# Patient Record
Sex: MALE | Race: WHITE | NOT HISPANIC OR LATINO | URBAN - METROPOLITAN AREA
[De-identification: names, ages, dates, MRNs, and addresses within clinical notes are randomized per-mention and may not be internally consistent; named-entity substitution may affect disease eponyms.]

---

## 2023-09-27 ENCOUNTER — HOSPITAL ENCOUNTER (OUTPATIENT)
Dept: DATA CONVERSION | Facility: HOSPITAL | Age: 58
Discharge: HOME | End: 2023-09-27

## 2023-09-27 DIAGNOSIS — N18.32 CHRONIC KIDNEY DISEASE, STAGE 3B (MULTI): ICD-10-CM

## 2023-09-27 LAB
ALBUMIN SERPL-MCNC: 3.5 GM/DL (ref 3.5–5)
ANION GAP SERPL CALCULATED.3IONS-SCNC: 15 MMOL/L (ref 0–19)
BUN SERPL-MCNC: 23 MG/DL (ref 8–25)
BUN/CREAT SERPL: 14.4 RATIO (ref 8–21)
CALCIUM SERPL-MCNC: 9.1 MG/DL (ref 8.5–10.4)
CHLORIDE SERPL-SCNC: 99 MMOL/L (ref 97–107)
CO2 SERPL-SCNC: 25 MMOL/L (ref 24–31)
CREAT SERPL-MCNC: 1.6 MG/DL (ref 0.4–1.6)
GFR SERPL CREATININE-BSD FRML MDRD: 50 ML/MIN/1.73 M2
GLUCOSE SERPL-MCNC: 140 MG/DL (ref 65–99)
PHOSPHATE SERPL-MCNC: 4.3 MG/DL (ref 2.5–4.5)
POTASSIUM SERPL-SCNC: 3.8 MMOL/L (ref 3.4–5.1)
SODIUM SERPL-SCNC: 139 MMOL/L (ref 133–145)

## 2023-12-05 PROBLEM — N17.9 ACUTE RENAL FAILURE SYNDROME (CMS-HCC): Status: ACTIVE | Noted: 2023-12-05

## 2023-12-05 PROBLEM — Z99.81 SUPPLEMENTAL OXYGEN DEPENDENT: Status: ACTIVE | Noted: 2023-12-05

## 2023-12-05 PROBLEM — J96.20 ACUTE ON CHRONIC RESPIRATORY FAILURE (MULTI): Status: ACTIVE | Noted: 2023-12-05

## 2023-12-05 PROBLEM — I21.4 ACUTE NON-ST SEGMENT ELEVATION MYOCARDIAL INFARCTION (MULTI): Status: RESOLVED | Noted: 2023-12-05 | Resolved: 2023-12-05

## 2023-12-05 PROBLEM — R09.02 HYPOXIA: Status: ACTIVE | Noted: 2023-12-05

## 2023-12-05 PROBLEM — I87.2 VENOUS STASIS DERMATITIS: Status: ACTIVE | Noted: 2023-12-05

## 2023-12-05 PROBLEM — I50.33 ACUTE ON CHRONIC DIASTOLIC HEART FAILURE (MULTI): Status: RESOLVED | Noted: 2023-12-05 | Resolved: 2023-12-05

## 2023-12-05 PROBLEM — I48.91 ATRIAL FIBRILLATION (MULTI): Status: ACTIVE | Noted: 2023-12-05

## 2023-12-05 PROBLEM — R06.00 DYSPNEA: Status: RESOLVED | Noted: 2023-12-05 | Resolved: 2023-12-05

## 2023-12-05 PROBLEM — R13.10 SWALLOWING PAIN: Status: ACTIVE | Noted: 2023-12-05

## 2023-12-05 PROBLEM — S49.90XA INJURY OF UPPER EXTREMITY: Status: ACTIVE | Noted: 2023-12-05
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
## 2023-12-05 PROBLEM — I50.33 ACUTE ON CHRONIC DIASTOLIC HEART FAILURE (MULTI): Status: ACTIVE | Noted: 2023-12-05

## 2023-12-05 PROBLEM — S09.90XA INJURY OF HEAD: Status: ACTIVE | Noted: 2023-12-05

## 2023-12-05 PROBLEM — I21.4 ACUTE NON-ST SEGMENT ELEVATION MYOCARDIAL INFARCTION (MULTI): Status: ACTIVE | Noted: 2023-12-05

## 2023-12-05 PROBLEM — R60.1 ANASARCA: Status: ACTIVE | Noted: 2023-12-05

## 2023-12-05 PROBLEM — E87.1 HYPONATREMIA: Status: ACTIVE | Noted: 2023-12-05

## 2023-12-05 PROBLEM — I10 HYPERTENSION: Status: RESOLVED | Noted: 2023-12-05 | Resolved: 2023-12-05

## 2023-12-05 PROBLEM — G47.34 IDIOPATHIC SLEEP RELATED NONOBSTRUCTIVE ALVEOLAR HYPOVENTILATION: Status: ACTIVE | Noted: 2023-12-05

## 2023-12-05 PROBLEM — I21.4 NON-STEMI (NON-ST ELEVATED MYOCARDIAL INFARCTION) (MULTI): Status: ACTIVE | Noted: 2023-12-05

## 2023-12-05 PROBLEM — E87.6 HYPOKALEMIA: Status: ACTIVE | Noted: 2023-12-05

## 2023-12-05 PROBLEM — J15.9 BACTERIAL PNEUMONIA: Status: ACTIVE | Noted: 2023-12-05

## 2023-12-05 PROBLEM — I10 HYPERTENSION: Status: ACTIVE | Noted: 2023-12-05

## 2023-12-05 PROBLEM — G47.33 OBSTRUCTIVE SLEEP APNEA SYNDROME: Status: ACTIVE | Noted: 2023-12-05

## 2023-12-05 PROBLEM — I48.91 ATRIAL FIBRILLATION (MULTI): Status: RESOLVED | Noted: 2023-12-05 | Resolved: 2023-12-05

## 2023-12-05 PROBLEM — E88.09 HYPOALBUMINEMIA: Status: ACTIVE | Noted: 2023-12-05

## 2023-12-05 PROBLEM — I21.4 NON-STEMI (NON-ST ELEVATED MYOCARDIAL INFARCTION) (MULTI): Status: RESOLVED | Noted: 2023-12-05 | Resolved: 2023-12-05

## 2023-12-05 PROBLEM — N18.32 STAGE 3B CHRONIC KIDNEY DISEASE (MULTI): Status: ACTIVE | Noted: 2023-12-05

## 2023-12-05 RX ORDER — PANTOPRAZOLE SODIUM 40 MG/1
40 TABLET, DELAYED RELEASE ORAL
COMMUNITY

## 2023-12-05 RX ORDER — RIVAROXABAN 20 MG/1
20 TABLET, FILM COATED ORAL
COMMUNITY
End: 2024-05-28

## 2023-12-05 RX ORDER — NADOLOL 20 MG/1
20 TABLET ORAL DAILY
COMMUNITY
End: 2024-05-28

## 2023-12-05 RX ORDER — AMIODARONE HYDROCHLORIDE 200 MG/1
200 TABLET ORAL DAILY
COMMUNITY

## 2023-12-05 RX ORDER — TORSEMIDE 100 MG/1
100 TABLET ORAL DAILY
COMMUNITY

## 2023-12-05 RX ORDER — SPIRONOLACTONE 25 MG/1
25 TABLET ORAL DAILY
COMMUNITY
End: 2024-05-28

## 2023-12-05 RX ORDER — POTASSIUM CHLORIDE 1500 MG/1
20 TABLET, EXTENDED RELEASE ORAL DAILY
COMMUNITY
Start: 2022-10-24

## 2023-12-05 RX ORDER — GUAIFENESIN 1200 MG
TABLET, EXTENDED RELEASE 12 HR ORAL
COMMUNITY
Start: 2021-08-13

## 2023-12-05 RX ORDER — ALBUTEROL SULFATE 90 UG/1
AEROSOL, METERED RESPIRATORY (INHALATION)
COMMUNITY
Start: 2022-01-05

## 2024-05-28 DIAGNOSIS — I10 PRIMARY HYPERTENSION: ICD-10-CM

## 2024-05-28 DIAGNOSIS — I50.33 ACUTE ON CHRONIC DIASTOLIC HEART FAILURE (MULTI): ICD-10-CM

## 2024-05-28 RX ORDER — RIVAROXABAN 20 MG/1
TABLET, FILM COATED ORAL
Qty: 90 TABLET | Refills: 3 | Status: SHIPPED | OUTPATIENT
Start: 2024-05-28

## 2024-05-28 RX ORDER — NADOLOL 20 MG/1
20 TABLET ORAL DAILY
Qty: 90 TABLET | Refills: 3 | Status: SHIPPED | OUTPATIENT
Start: 2024-05-28

## 2024-05-28 RX ORDER — SPIRONOLACTONE 25 MG/1
25 TABLET ORAL DAILY
Qty: 90 TABLET | Refills: 3 | Status: SHIPPED | OUTPATIENT
Start: 2024-05-28

## 2024-05-28 NOTE — TELEPHONE ENCOUNTER
Patients pharmacy is requesting refill of the following medication(s) for a 90 day supply with refills.   Please send the attached prescription(s) as soon as possible.   Thank you.    Requested Prescriptions     Pending Prescriptions Disp Refills    spironolactone (Aldactone) 25 mg tablet [Pharmacy Med Name: Spironolactone Oral Tablet 25 MG] 90 tablet 3     Sig: TAKE ONE TABLET BY MOUTH ONCE A DAY    nadolol (Corgard) 20 mg tablet [Pharmacy Med Name: Nadolol Oral Tablet 20 MG] 90 tablet 3     Sig: TAKE ONE TABLET BY MOUTH ONCE A DAY    Xarelto 20 mg tablet [Pharmacy Med Name: Xarelto Oral Tablet 20 MG] 90 tablet 3     Sig: TAKE ONE TABLET BY MOUTH once EVERY DAY WITH FOOD

## 2024-05-29 DIAGNOSIS — I50.33 ACUTE ON CHRONIC DIASTOLIC HEART FAILURE (MULTI): ICD-10-CM

## 2024-05-29 DIAGNOSIS — E87.6 HYPOKALEMIA: ICD-10-CM

## 2024-05-29 RX ORDER — POTASSIUM CHLORIDE 20 MEQ/1
TABLET, EXTENDED RELEASE ORAL
Qty: 90 TABLET | Refills: 3 | Status: SHIPPED | OUTPATIENT
Start: 2024-05-29

## 2024-05-29 NOTE — TELEPHONE ENCOUNTER
Patients pharmacy is requesting refill of the following medication(s) for a 90 day supply with refills.   Please send the attached prescription(s) as soon as possible.   Thank you.    Requested Prescriptions     Pending Prescriptions Disp Refills    potassium chloride CR 20 mEq ER tablet [Pharmacy Med Name: Potassium Chloride Asya ER Oral Tablet Extended Release 20 MEQ] 90 tablet 3     Sig: TAKE ONE TABLET BY MOUTH once EVERY DAY WITH FOOD

## 2024-06-09 NOTE — PROGRESS NOTES
Subjective   Byron Sherman is a 58 y.o. male who presents for NPV TO ESTABLISH PCP CARE and FOR CARE GAP REVIEW.    HPI:    58 y.o. male who presents for NPV TO ESTABLISH PCP CARE and FOR CARE GAP REVIEW.       EMR/EPIC records reviewed.    PMHx:  HTN  Atrial fibrillation (Multi); on xarelto  Stage 3b chronic kidney disease (Multi)  Acute non-ST segment elevation myocardial infarction (Multi)  Acute on chronic diastolic heart failure (Multi)  Acute on chronic respiratory failure (Multi)  Acute renal failure syndrome (CMS-HCC)  Anasarca  Bacterial pneumonia  Hypoalbuminemia  Hypokalemia  Hyponatremia  Hypoxia  Idiopathic sleep related nonobstructive alveolar hypoventilation  Injury of head  Injury of upper extremity  Obstructive sleep apnea syndrome  Supplemental oxygen dependent  Swallowing pain  Venous stasis dermatitis  Accidental fall    Healthcare Providers:  Cardiology:  NONE  Nephrologist: Dr. Michaud; last seen 11/2023    Preventive Health Services:  -Last physical: ?  -last colonoscopy or cologuard: ?  -last STI screening: ?  -Hep C screening: ?      Immunizations:   -Childhood vaccines: completed per patient    -COVID vaccine and booster:  -updated COVID spike vaccine: NOW DUE  -shingles: NOW DUE  -prevnar 20:   NOW DUE    Immunization History   Administered Date(s) Administered    Flu vaccine, quadrivalent, recombinant, preservative free, adult (FLUBLOK) 10/22/2021, 10/17/2022    Moderna SARS-CoV-2 Vaccination 04/02/2021, 04/30/2021    Pneumococcal polysaccharide vaccine, 23-valent, age 2 years and older (PNEUMOVAX 23) 10/22/2021    Tdap vaccine, age 7 year and older (BOOSTRIX, ADACEL) 06/13/2022       Today  Byron reports:    - doing and feeling well.     -taking all medications as prescribed with no reported adverse medication side effects        Today he no other reported complaints, issues, or problems.  ROS is NEG for HEADACHE, NAUSEA, VOMITING, DIARRHEA, CHEST PAIN, SOB, and BLEEDING.  Review of  systems (10+) is negative for all systems except for any identified issues in HPI above.        Objective     /76 (BP Location: Right arm, Patient Position: Sitting, BP Cuff Size: Thigh)   Pulse 68   Temp 36.3 °C (97.4 °F) (Tympanic)   Wt (!) 193 kg (425 lb)   SpO2 100%   BMI 59.28 kg/m²      Physical Examination:       GENERAL           General Appearance: well-appearing, well-developed, well-hydrated, well-nourished, no acute distress, morbidly obese        HEENT           NECK supple, no masses or thyromegaly, no carotid bruit.        EYES           Extraocular Movements: normal, bilateral eyes ROSETTE, no conjunctival injection.        HEART           Rate and Rhythm regular rate and rhythm. Heart sounds: normal S1S2, no S3 or S4. Murmurs: none.        CHEST           Breath sounds: Clear to IPPA, RR<16 no use of accessory muscles.        ABDOMEN           General: Neg for LKKS or masses, no scleral icterus or jaundice.        MUSCULOSKELETAL           Joints Demonstration: Neg for erythema, swelling or joint deformities. gross abnormalities no gross abnormalities.        EXTREMITIES           Lower Extremities: Neg for cyanosis, clubbing or edema.       Assessment/Plan   Problem List Items Addressed This Visit       Acute on chronic respiratory failure (Multi)    Relevant Orders    Referral to Cardiology    Hypertension    Relevant Orders    Comprehensive metabolic panel    Urinalysis with Reflex Microscopic    Hypokalemia    Relevant Orders    Comprehensive metabolic panel    Non-STEMI (non-ST elevated myocardial infarction) (Multi)    Relevant Orders    Referral to Cardiology    Referral to Cardiology    Obstructive sleep apnea syndrome    Relevant Orders    Referral to Adult Sleep Medicine    Atrial fibrillation (Multi)    Relevant Orders    Referral to Cardiology    Referral to Cardiology    Stage 3b chronic kidney disease (Multi)    Relevant Orders    Comprehensive metabolic panel    Urinalysis  with Reflex Microscopic    Referral to Nephrology    Supplemental oxygen dependent    Relevant Orders    Referral to Pulmonology     Other Visit Diagnoses       Encounter to establish care    -  Primary    Relevant Orders    CBC and Auto Differential    Comprehensive metabolic panel    Urinalysis with Reflex Microscopic    Tsh With Reflex To Free T4 If Abnormal    Lipid screening        Relevant Orders    Lipid panel    Diabetes mellitus screening        Relevant Orders    Hemoglobin A1c    Vitamin D deficiency        Relevant Orders    Vitamin D 25-Hydroxy,Total (for eval of Vitamin D levels)    Encounter for hepatitis C screening test for low risk patient        Relevant Orders    Hepatitis C antibody    Routine screening for STI (sexually transmitted infection)        Relevant Orders    HIV 1/2 Antigen/Antibody Screen with Reflex to Confirmation    Syphilis Screen with Reflex    C. trachomatis / N. gonorrhoeae, DNA probe    Trichomonas vaginalis, Amplified    Colon cancer screening        Relevant Orders    Cologuard® colon cancer screening    Colonoscopy Screening; Average Risk Patient    Immunization counseling        Encounter for screening for coronary artery disease        Relevant Orders    CT cardiac scoring wo IV contrast    Prostate cancer screening        Relevant Orders    Prostate Spec.Ag,Screen          Establish PCP care  -labs ordered (see A/P above for details)    HTN: 136/76; well controlled  -CMP, UA ordered  -cont BP medications  -low salt, low cholesterol, low fat diet    Afib on xaerlto and CHF: rate controlled, no signs of ADHF.   -cont medications  -referral to cardiology Dr. Vallabehini ordered  -ordered general cardiology referral as well (to be seen by first available provider)  -Emergency Room precautions discussed and reviewed with patient    CKD stage 3B:  -CMP ordered  -cont management per nephrology  -BP control to preserve renal function    Morbid obesity:  -lipid panel and HgBA1c  ordered  -low salt, low cholesterol, low fat diet, regularly exercise  -encouraged weight loss      Supplemental O2 dependence: does not use supplemental O2  -referral to pulmonology ordered  -Emergency Room precautions discussed and reviewed with patient    CAMILLE: not using CPAP  -referral to sleep medicine    Lipid Disorder screening  -lipid panel ordered    Diabetes Screening  -HgBA1c ordered    Vitamin D deficiency  -Vit D levels ordered     Hep C screening  -Hep C antibody ordered     STI Screening:  -HIV, syphilis, GC/CT, trich ordered    Colon Cancer Screening: NOW DUE  -cologuard and colonoscopy ordered; patient advised to complete only one of these colon cancer screenings and not both    Prostate Cancer Screening:  -PSA ordered    Heart Disease Screening:  -CT Heart Ca ordered     Counseling:       Medication education:         Education:  The patient is counseled regarding potential side-effects of all new medications        Understanding:  Patient expressed understanding        Adherence:  No barriers to adherence identified        Immunizations Counseling  -Prevnar 20 and shingles now due ==>   -recommend updated COVID spike vaccine that can be obtained at local pharmacy     FOLLOW-UP: 4 weeks to discuss and review test results and 8 weeks for PHYSICAL     Discussed recommended plan of care with patient. Patient expressed understanding and agreement with plan of care. All of patient's questions were answered at the time. Patient had no additional questions at the time.                 Ying Nick MD, PhD

## 2024-06-09 NOTE — PATIENT INSTRUCTIONS
It was nice meeting you today.    Please go to [facility] lab or another [facility] lab facility to complete the lab testing that I ordered      Please call radiology to schedule  CT Heart Ca scoring     Please call referral line to schedule to see: 1) pulmonology and 2) cardiology and 3) sleep medicine; and 4) cardiology    Please call to see your nephrologist    Please call cardiology Dr. Montiel's office to schedule appt who comes to our office on some Thursdays.    Please see first available cardiologist for heart failure and afib.    I have also ordered a cologuard if you prefer instead of a colonoscopy to screen for colon cancer screening that will be sent to your home. Only complete EITHER the colonoscopy or cologuard and not both.     I recommend receiving the, shingles vaccine, and  the Prevnar 20 pneumonia vaccine    I recommend the updated COVID vaccine that can be obtained at your local pharmacy    Please schedule a follow up with me in 4  weeks to discuss and review your test results    If you develop chest pain, heart palpitations or pass out immediately call 911

## 2024-06-10 ENCOUNTER — OFFICE VISIT (OUTPATIENT)
Dept: PRIMARY CARE | Facility: CLINIC | Age: 59
End: 2024-06-10
Payer: COMMERCIAL

## 2024-06-10 VITALS
DIASTOLIC BLOOD PRESSURE: 76 MMHG | TEMPERATURE: 97.4 F | HEART RATE: 68 BPM | WEIGHT: 315 LBS | BODY MASS INDEX: 59.28 KG/M2 | OXYGEN SATURATION: 100 % | SYSTOLIC BLOOD PRESSURE: 136 MMHG

## 2024-06-10 DIAGNOSIS — Z12.11 COLON CANCER SCREENING: ICD-10-CM

## 2024-06-10 DIAGNOSIS — Z99.81 SUPPLEMENTAL OXYGEN DEPENDENT: ICD-10-CM

## 2024-06-10 DIAGNOSIS — Z13.6 ENCOUNTER FOR SCREENING FOR CORONARY ARTERY DISEASE: ICD-10-CM

## 2024-06-10 DIAGNOSIS — Z76.89 ENCOUNTER TO ESTABLISH CARE: Primary | ICD-10-CM

## 2024-06-10 DIAGNOSIS — E55.9 VITAMIN D DEFICIENCY: ICD-10-CM

## 2024-06-10 DIAGNOSIS — Z71.85 IMMUNIZATION COUNSELING: ICD-10-CM

## 2024-06-10 DIAGNOSIS — I21.4 NON-STEMI (NON-ST ELEVATED MYOCARDIAL INFARCTION) (MULTI): ICD-10-CM

## 2024-06-10 DIAGNOSIS — Z13.1 DIABETES MELLITUS SCREENING: ICD-10-CM

## 2024-06-10 DIAGNOSIS — Z12.5 PROSTATE CANCER SCREENING: ICD-10-CM

## 2024-06-10 DIAGNOSIS — G47.33 OBSTRUCTIVE SLEEP APNEA SYNDROME: ICD-10-CM

## 2024-06-10 DIAGNOSIS — Z11.3 ROUTINE SCREENING FOR STI (SEXUALLY TRANSMITTED INFECTION): ICD-10-CM

## 2024-06-10 DIAGNOSIS — N18.32 STAGE 3B CHRONIC KIDNEY DISEASE (MULTI): ICD-10-CM

## 2024-06-10 DIAGNOSIS — J96.21 ACUTE ON CHRONIC RESPIRATORY FAILURE WITH HYPOXIA (MULTI): ICD-10-CM

## 2024-06-10 DIAGNOSIS — I48.91 ATRIAL FIBRILLATION, UNSPECIFIED TYPE (MULTI): ICD-10-CM

## 2024-06-10 DIAGNOSIS — I10 PRIMARY HYPERTENSION: ICD-10-CM

## 2024-06-10 DIAGNOSIS — Z11.59 ENCOUNTER FOR HEPATITIS C SCREENING TEST FOR LOW RISK PATIENT: ICD-10-CM

## 2024-06-10 DIAGNOSIS — E87.6 HYPOKALEMIA: ICD-10-CM

## 2024-06-10 DIAGNOSIS — Z13.220 LIPID SCREENING: ICD-10-CM

## 2024-06-10 PROCEDURE — 3078F DIAST BP <80 MM HG: CPT | Performed by: FAMILY MEDICINE

## 2024-06-10 PROCEDURE — 3075F SYST BP GE 130 - 139MM HG: CPT | Performed by: FAMILY MEDICINE

## 2024-06-10 PROCEDURE — 1036F TOBACCO NON-USER: CPT | Performed by: FAMILY MEDICINE

## 2024-06-10 PROCEDURE — 99204 OFFICE O/P NEW MOD 45 MIN: CPT | Performed by: FAMILY MEDICINE

## 2024-06-10 ASSESSMENT — COLUMBIA-SUICIDE SEVERITY RATING SCALE - C-SSRS
6. HAVE YOU EVER DONE ANYTHING, STARTED TO DO ANYTHING, OR PREPARED TO DO ANYTHING TO END YOUR LIFE?: NO
1. IN THE PAST MONTH, HAVE YOU WISHED YOU WERE DEAD OR WISHED YOU COULD GO TO SLEEP AND NOT WAKE UP?: NO
2. HAVE YOU ACTUALLY HAD ANY THOUGHTS OF KILLING YOURSELF?: NO

## 2024-06-10 ASSESSMENT — PAIN SCALES - GENERAL: PAINLEVEL: 0-NO PAIN

## 2024-06-26 LAB — NONINV COLON CA DNA+OCC BLD SCRN STL QL: POSITIVE

## 2024-07-14 NOTE — PROGRESS NOTES
Subjective   Byron Sherman is a 58 y.o. male who presents for FOLLOW UP VISIT TO DISCUSS and REVIEW TEST RESULTS.    HPI:    58 y.o. male who presents for FOLLOW UP VISIT TO DISCUSS and REVIEW TEST RESULTS.    Last seen by me on 6/10/24 for NPV TO ESTABLISH PCP CARE and FOR CARE GAP REVIEW. At visit:  Establish PCP care  -labs ordered (see A/P above for details)     HTN: 136/76; well controlled  -CMP, UA ordered  -cont BP medications  -low salt, low cholesterol, low fat diet     Afib on xaerlto and CHF: rate controlled, no signs of ADHF.   -cont medications  -referral to cardiology Dr. Vallabehini ordered  -ordered general cardiology referral as well (to be seen by first available provider)  -Emergency Room precautions discussed and reviewed with patient     CKD stage 3B:  -CMP ordered  -cont management per nephrology  -BP control to preserve renal function     Morbid obesity:  -lipid panel and HgBA1c ordered  -low salt, low cholesterol, low fat diet, regularly exercise  -encouraged weight loss        Supplemental O2 dependence: does not use supplemental O2  -referral to pulmonology ordered  -Emergency Room precautions discussed and reviewed with patient     CAMILLE: not using CPAP  -referral to sleep medicine     Lipid Disorder screening  -lipid panel ordered     Diabetes Screening  -HgBA1c ordered     Vitamin D deficiency  -Vit D levels ordered     Hep C screening  -Hep C antibody ordered     STI Screening:  -HIV, syphilis, GC/CT, trich ordered     Colon Cancer Screening: NOW DUE  -cologuard and colonoscopy ordered; patient advised to complete only one of these colon cancer screenings and not both     Prostate Cancer Screening:  -PSA ordered     Heart Disease Screening:  -CT Heart Ca ordered     Immunizations Counseling  -Prevnar 20 and shingles now due ==>   -recommend updated COVID spike vaccine that can be obtained at local pharmacy     FOLLOW-UP: 4 weeks to discuss and review test results and 8 weeks for PHYSICAL           PMHx:  HTN  Atrial fibrillation (Multi); on xarelto  Stage 3b chronic kidney disease (Multi)  Acute non-ST segment elevation myocardial infarction (Multi)  Acute on chronic diastolic heart failure (Multi)  Acute on chronic respiratory failure (Multi)  Acute renal failure syndrome (CMS-HCC)  Anasarca  Bacterial pneumonia  Hypoalbuminemia  Hypokalemia  Hyponatremia  Hypoxia  Idiopathic sleep related nonobstructive alveolar hypoventilation  Injury of head  Injury of upper extremity  Obstructive sleep apnea syndrome  Supplemental oxygen dependent  Swallowing pain  Venous stasis dermatitis  Accidental fall     Healthcare Providers:  Cardiology:  NONE  Nephrologist: Dr. Michaud; last seen 11/2023     Preventive Health Services:  -Last physical: ?  -last colonoscopy or cologuard: NEGATIVE COLOGUARD 6/21/24==> NEXT COLOGUARD DUE 6/21/27  -last STI screening: ?  -Hep C screening: ?        Immunizations:   -Childhood vaccines: completed per patient    -updated COVID spike vaccine: NOW DUE  -shingles: NOW DUE  -prevnar 20:   NOW DUE     Immunization History   Administered Date(s) Administered    Flu vaccine, quadrivalent, recombinant, preservative free, adult (FLUBLOK) 10/22/2021, 10/17/2022    Moderna SARS-CoV-2 Vaccination 04/02/2021, 04/30/2021    Pneumococcal polysaccharide vaccine, 23-valent, age 2 years and older (PNEUMOVAX 23) 10/22/2021    Tdap vaccine, age 7 year and older (BOOSTRIX, ADACEL) 06/13/2022       INTERVAL HISTORY     -completed cologuard colon cancer screening 6/21/24 that was negative     -labs ordered 6/10/24 NOT YET COMPLETED      Today  Byron reports:     - doing and feeling well.      -taking all medications as prescribed with no reported adverse medication side effects    -has not yet scheduled cardiology, nephrology, or pulmonology appts with referrals ordered at visit 6/10/24          Today he no other reported complaints, issues, or problems.  ROS is NEG for HEADACHE, NAUSEA, VOMITING,  DIARRHEA, CHEST PAIN, SOB, and BLEEDING.  Review of systems (10+) is negative for all systems except for any identified issues in HPI above.           Objective     There were no vitals taken for this visit.     Physical Examination:       GENERAL           General Appearance: well-appearing, well-developed, well-hydrated, well-nourished, no acute distress.        HEENT           NECK supple, no masses or thyromegaly, no carotid bruit.        EYES           Extraocular Movements: normal, bilateral eyes ROSETTE, no conjunctival injection.        HEART           Rate and Rhythm regular rate and rhythm. Heart sounds: normal S1S2, no S3 or S4. Murmurs: none.        CHEST           Breath sounds: Clear to IPPA, RR<16 no use of accessory muscles.        ABDOMEN           General: Neg for LKKS or masses, no scleral icterus or jaundice.        MUSCULOSKELETAL           Joints Demonstration: Neg for erythema, swelling or joint deformities. gross abnormalities no gross abnormalities.        EXTREMITIES           Lower Extremities: Neg for cyanosis, clubbing or edema.       Assessment/Plan   Problem List Items Addressed This Visit    None      HTN: well controlled  -CMP, UA ordered 6/10/24==> NOT YET COMPLETED==> patient advised to go to  lab to complete  -cont BP medications  -low salt, low cholesterol, low fat diet     Afib on xaerlto and CHF: rate controlled, no signs of ADHF.   -cont medications  -referral to cardiology Dr. Vallabehini ordered 6/10/24  -ordered general cardiology referral as well (to be seen by first available provider) 6/10/24  -Emergency Room precautions discussed and reviewed with patient     CKD stage 3B:  -CMP ordered 6/10/24==> NOT YET COMPLETED==> patient advised to go to  lab to complete  -cont management per nephrology  -BP control to preserve renal function     Morbid obesity:  -lipid panel and HgBA1c ordered 6/10/24==> NOT YET COMPLETED==> patient advised to go to  lab to complete  -low  salt, low cholesterol, low fat diet, regularly exercise  -encouraged weight loss  -will cont to monitor        Supplemental O2 dependence: does not use supplemental O2 as previously advised  -referral to pulmonology ordered 6/10/24  -Emergency Room precautions discussed and reviewed with patient     CAMILLE: not using CPAP  -referral to sleep medicine ordered 6/10/24     Lipid Disorder screening  -lipid panel ordered 6/10/24==> NOT YET COMPLETED==> patient advised to go to [facility] lab to complete     Diabetes Screening  -HgBA1c ordered 6/10/24==> NOT YET COMPLETED==> patient advised to go to [facility] lab to complete     Vitamin D deficiency  -Vit D levels ordered 6/10/24==> NOT YET COMPLETED==> patient advised to go to [facility] lab to complete     Hep C screening  -Hep C antibody ordered 6/10/24==> NOT YET COMPLETED==> patient advised to go to [facility] lab to complete     STI Screening:  -HIV, syphilis, GC/CT, trich ordered 6/10/24==> NOT YET COMPLETED==> patient advised to go to [facility] lab to complete     Colon Cancer Screening:  NEGATIVE COLOGUARD 6/21/24==> NEXT COLOGUARD DUE 6/21/27       Prostate Cancer Screening:  -PSA ordered  6/10/24==> NOT YET COMPLETED==> patient advised to go to [facility] lab to complete     Heart Disease Screening:  -CT Heart Ca ordered 6/10/24     Counseling:       Medication education:         Education:  The patient is counseled regarding potential side-effects of all new medications        Understanding:  Patient expressed understanding        Adherence:  No barriers to adherence identified        Immunizations Counseling  -Prevnar 20 and shingles now due ==> declined today  -recommend updated COVID spike vaccine that can be obtained at local pharmacy     FOLLOW-UP: 4 weeks to discuss and review test results and 8 weeks for PHYSICAL     Discussed recommended plan of care with patient. Patient expressed understanding and agreement with plan of care. All of patient's questions were answered at the time. Patient had no  additional questions at the time.         Ying Nick MD, PhD

## 2024-07-15 ENCOUNTER — APPOINTMENT (OUTPATIENT)
Dept: PRIMARY CARE | Facility: CLINIC | Age: 59
End: 2024-07-15
Payer: COMMERCIAL

## 2024-07-15 DIAGNOSIS — G47.33 OBSTRUCTIVE SLEEP APNEA SYNDROME: ICD-10-CM

## 2024-07-15 DIAGNOSIS — I48.91 ATRIAL FIBRILLATION, UNSPECIFIED TYPE (MULTI): ICD-10-CM

## 2024-07-15 DIAGNOSIS — Z11.59 ENCOUNTER FOR HEPATITIS C SCREENING TEST FOR LOW RISK PATIENT: ICD-10-CM

## 2024-07-15 DIAGNOSIS — Z12.5 PROSTATE CANCER SCREENING: ICD-10-CM

## 2024-07-15 DIAGNOSIS — I10 PRIMARY HYPERTENSION: Primary | ICD-10-CM

## 2024-07-15 DIAGNOSIS — I21.4 NON-STEMI (NON-ST ELEVATED MYOCARDIAL INFARCTION) (MULTI): ICD-10-CM

## 2024-07-15 DIAGNOSIS — E55.9 VITAMIN D DEFICIENCY: ICD-10-CM

## 2024-07-15 DIAGNOSIS — Z11.3 ROUTINE SCREENING FOR STI (SEXUALLY TRANSMITTED INFECTION): ICD-10-CM

## 2024-07-15 DIAGNOSIS — Z71.85 IMMUNIZATION COUNSELING: ICD-10-CM

## 2024-07-15 DIAGNOSIS — Z13.1 DIABETES MELLITUS SCREENING: ICD-10-CM

## 2024-07-15 DIAGNOSIS — Z13.220 LIPID SCREENING: ICD-10-CM

## 2024-07-15 DIAGNOSIS — N18.32 STAGE 3B CHRONIC KIDNEY DISEASE (MULTI): ICD-10-CM

## 2024-07-28 NOTE — PROGRESS NOTES
Subjective   Byron Sherman is a 58 y.o. male who presents for FOLLOW UP VISIT TO DISCUSS and REVIEW TEST RESULTS.    HPI:    58 y.o. male who presents for FOLLOW UP VISIT TO DISCUSS and REVIEW TEST RESULTS.    Last seen by me on 6/10/24 for NPV TO ESTABLISH PCP CARE and FOR CARE GAP REVIEW. At visit:  Establish PCP care  -labs ordered (see A/P above for details)     HTN: 136/76; well controlled  -CMP, UA ordered  -cont BP medications  -low salt, low cholesterol, low fat diet     Afib on xaerlto and CHF: rate controlled, no signs of ADHF.   -cont medications  -referral to cardiology Dr. Vallabehini ordered  -ordered general cardiology referral as well (to be seen by first available provider)  -Emergency Room precautions discussed and reviewed with patient     CKD stage 3B:  -CMP ordered  -cont management per nephrology  -BP control to preserve renal function     Morbid obesity:  -lipid panel and HgBA1c ordered  -low salt, low cholesterol, low fat diet, regularly exercise  -encouraged weight loss        Supplemental O2 dependence: does not use supplemental O2  -referral to pulmonology ordered  -Emergency Room precautions discussed and reviewed with patient     CAMILLE: not using CPAP  -referral to sleep medicine     Lipid Disorder screening  -lipid panel ordered     Diabetes Screening  -HgBA1c ordered     Vitamin D deficiency  -Vit D levels ordered     Hep C screening  -Hep C antibody ordered     STI Screening:  -HIV, syphilis, GC/CT, trich ordered     Colon Cancer Screening: NOW DUE  -cologuard and colonoscopy ordered; patient advised to complete only one of these colon cancer screenings and not both     Prostate Cancer Screening:  -PSA ordered     Heart Disease Screening:  -CT Heart Ca ordered     Immunizations Counseling  -Prevnar 20 and shingles now due ==>   -recommend updated COVID spike vaccine that can be obtained at local pharmacy     FOLLOW-UP: 4 weeks to discuss and review test results and 8 weeks for PHYSICAL           PMHx:  HTN  Atrial fibrillation (Multi); on xarelto  Stage 3b chronic kidney disease (Multi)  Acute non-ST segment elevation myocardial infarction (Multi)  Acute on chronic diastolic heart failure (Multi)  Acute on chronic respiratory failure (Multi)  Acute renal failure syndrome (CMS-HCC)  Anasarca  Bacterial pneumonia  Hypoalbuminemia  Hypokalemia  Hyponatremia  Hypoxia  Idiopathic sleep related nonobstructive alveolar hypoventilation  Injury of head  Injury of upper extremity  Obstructive sleep apnea syndrome  Supplemental oxygen dependent  Swallowing pain  Venous stasis dermatitis  Accidental fall     Healthcare Providers:  Cardiology:  NONE  Nephrologist: Dr. Michaud; last seen 11/2023     Preventive Health Services:  -Last physical: ?  -last colonoscopy or cologuard: POSITIVE COLOGUARD 6/21/24==> DIAGNOSTIC COLONOSCOPY ORDERED  -last STI screening: ?  -Hep C screening: ?        Immunizations:   -Childhood vaccines: completed per patient    -updated COVID spike vaccine: NOW DUE  -shingles: NOW DUE  -prevnar 20:   NOW DUE     Immunization History   Administered Date(s) Administered    Flu vaccine, quadrivalent, recombinant, preservative free, adult (FLUBLOK) 10/22/2021, 10/17/2022    Moderna SARS-CoV-2 Vaccination 04/02/2021, 04/30/2021    Pneumococcal polysaccharide vaccine, 23-valent, age 2 years and older (PNEUMOVAX 23) 10/22/2021    Tdap vaccine, age 7 year and older (BOOSTRIX, ADACEL) 06/13/2022       INTERVAL HISTORY     -completed cologuard colon cancer screening 6/21/24 that was POSITIVE     -ABNORMAL POSITIVE COLOGUARD COLON CANCER SCREENING TEST that REQUIRES DIAGNOSTIC COLONOSCOPY TO RULE OUT COLON CANCER.     ==> patient advised I have ordered a diagnostic colonoscopy; please call to schedule since This is very important.     -labs ordered 6/10/24 NOT YET COMPLETED      Today  Byron reports:     - doing and feeling well.      -taking all medications as prescribed with no reported adverse  medication side effects    -has not yet scheduled cardiology, nephrology, or pulmonology appts with referrals ordered at visit 6/10/24    -has not yet scheduled diagnostic colonoscopy for positive cologuard test; he states that he will call to schedule and that he understands that it is important to complete to rule out colon cancer          Today he no other reported complaints, issues, or problems.    ROS is NEG for HEADACHE, NAUSEA, VOMITING, DIARRHEA, CHEST PAIN, SOB, and BLEEDING.  Review of systems (10+) is negative for all systems except for any identified issues in HPI above.           Objective     There were no vitals taken for this visit.     Physical Examination:       GENERAL           General Appearance: well-appearing, well-developed, well-hydrated, well-nourished, no acute distress.        HEENT           NECK supple, no masses or thyromegaly, no carotid bruit.        EYES           Extraocular Movements: normal, bilateral eyes ROSETTE, no conjunctival injection.        HEART           Rate and Rhythm regular rate and rhythm. Heart sounds: normal S1S2, no S3 or S4. Murmurs: none.        CHEST           Breath sounds: Clear to IPPA, RR<16 no use of accessory muscles.        ABDOMEN           General: Neg for LKKS or masses, no scleral icterus or jaundice.        MUSCULOSKELETAL           Joints Demonstration: Neg for erythema, swelling or joint deformities. gross abnormalities no gross abnormalities.        EXTREMITIES           Lower Extremities: Neg for cyanosis, clubbing or edema.       Assessment/Plan   Problem List Items Addressed This Visit    None    ABNORMAL POSITIVE COLOGUARD COLON CANCER SCREENING TEST (6/21/24) that REQUIRES DIAGNOSTIC COLONOSCOPY TO RULE OUT COLON CANCER.   -diagnostic colonoscopy previously ordered. Patient advised this is very important to complete to rule out colon cancer. He states that he will call to schedule     HTN: well controlled  -CMP, UA ordered 6/10/24==> NOT  YET COMPLETED==> patient advised to go to [facility] lab to complete  -cont BP medications  -low salt, low cholesterol, low fat diet     Afib on xaerlto and CHF: rate controlled, no signs of ADHF.   -cont medications  -referral to cardiology Dr. Vallabehini ordered 6/10/24  -ordered general cardiology referral as well (to be seen by first available provider) 6/10/24  -Emergency Room precautions discussed and reviewed with patient     CKD stage 3B:  -CMP ordered 6/10/24==> NOT YET COMPLETED==> patient advised to go to [facility] lab to complete  -cont management per nephrology  -BP control to preserve renal function     Morbid obesity:  -lipid panel and HgBA1c ordered 6/10/24==> NOT YET COMPLETED==> patient advised to go to [facility] lab to complete  -low salt, low cholesterol, low fat diet, regularly exercise  -encouraged weight loss  -will cont to monitor        Supplemental O2 dependence: does not use supplemental O2 as previously advised  -referral to pulmonology ordered 6/10/24  -Emergency Room precautions discussed and reviewed with patient     CAMILLE: not using CPAP  -referral to sleep medicine ordered 6/10/24     Lipid Disorder screening  -lipid panel ordered 6/10/24==> NOT YET COMPLETED==> patient advised to go to [facility] lab to complete     Diabetes Screening  -HgBA1c ordered 6/10/24==> NOT YET COMPLETED==> patient advised to go to [facility] lab to complete     Vitamin D deficiency  -Vit D levels ordered 6/10/24==> NOT YET COMPLETED==> patient advised to go to [facility] lab to complete     Hep C screening  -Hep C antibody ordered 6/10/24==> NOT YET COMPLETED==> patient advised to go to [facility] lab to complete     STI Screening:  -HIV, syphilis, GC/CT, trich ordered 6/10/24==> NOT YET COMPLETED==> patient advised to go to [facility] lab to complete       Prostate Cancer Screening:  -PSA ordered  6/10/24==> NOT YET COMPLETED==> patient advised to go to [facility] lab to complete     Heart Disease Screening:  -CT Heart Ca ordered 6/10/24     Counseling:       Medication  education:         Education:  The patient is counseled regarding potential side-effects of all new medications        Understanding:  Patient expressed understanding        Adherence:  No barriers to adherence identified        Immunizations Counseling  -Prevnar 20 and shingles now due ==> declined today  -recommend updated COVID spike vaccine that can be obtained at local pharmacy     FOLLOW-UP: 4 weeks to discuss and review test results and 8 weeks for PHYSICAL     Discussed recommended plan of care with patient. Patient expressed understanding and agreement with plan of care. All of patient's questions were answered at the time. Patient had no additional questions at the time.         Ying Nick MD, PhD

## 2024-07-30 ENCOUNTER — APPOINTMENT (OUTPATIENT)
Dept: PRIMARY CARE | Facility: CLINIC | Age: 59
End: 2024-07-30
Payer: COMMERCIAL

## 2024-07-30 DIAGNOSIS — N18.32 STAGE 3B CHRONIC KIDNEY DISEASE (MULTI): ICD-10-CM

## 2024-07-30 DIAGNOSIS — Z13.220 LIPID SCREENING: ICD-10-CM

## 2024-07-30 DIAGNOSIS — I10 PRIMARY HYPERTENSION: ICD-10-CM

## 2024-07-30 DIAGNOSIS — G47.33 OBSTRUCTIVE SLEEP APNEA SYNDROME: ICD-10-CM

## 2024-07-30 DIAGNOSIS — E55.9 VITAMIN D DEFICIENCY: ICD-10-CM

## 2024-07-30 DIAGNOSIS — R19.5 POSITIVE COLORECTAL CANCER SCREENING USING COLOGUARD TEST: Primary | ICD-10-CM

## 2024-07-30 DIAGNOSIS — Z13.1 DIABETES MELLITUS SCREENING: ICD-10-CM

## 2024-07-30 DIAGNOSIS — Z71.85 IMMUNIZATION COUNSELING: ICD-10-CM

## 2024-07-30 DIAGNOSIS — I48.91 ATRIAL FIBRILLATION, UNSPECIFIED TYPE (MULTI): ICD-10-CM

## 2024-08-11 NOTE — PROGRESS NOTES
Subjective   Byron Sherman is a 59 y.o. male who presents for FOLLOW UP VISIT TO DISCUSS and REVIEW TEST RESULTS, INCLUDING POSITIVE COLOGUARD.    HPI:    58 y.o. male who presents for FOLLOW UP VISIT TO DISCUSS and REVIEW TEST RESULTS, INCLUDING POSITIVE COLOGUARD..     [facility] records reviewed.    Last seen by me on 6/10/24 for NPV TO ESTABLISH PCP CARE and FOR CARE GAP REVIEW. At visit:    Establish PCP care  -labs ordered (see A/P above for details)     HTN: 136/76  -CMP, UA ordered  -cont BP medications  -low salt, low cholesterol, low fat diet     Afib on xaerlto and CHF: rate controlled, no signs of ADHF.   -cont medications  -referral to cardiology Dr. Vallabehini ordered  -ordered general cardiology referral as well (to be seen by first available provider)  -Emergency Room precautions discussed and reviewed with patient     CKD stage 3B:  -CMP ordered  -cont management per nephrology  -BP control to preserve renal function     Morbid obesity:  -lipid panel and HgBA1c ordered  -low salt, low cholesterol, low fat diet, regularly exercise  -encouraged weight loss        Supplemental O2 dependence: does not use supplemental O2  -referral to pulmonology ordered  -Emergency Room precautions discussed and reviewed with patient     CAMILLE: not using CPAP  -referral to sleep medicine     Lipid Disorder screening  -lipid panel ordered     Diabetes Screening  -HgBA1c ordered     Vitamin D deficiency  -Vit D levels ordered     Hep C screening  -Hep C antibody ordered     STI Screening:  -HIV, syphilis, GC/CT, trich ordered     Colon Cancer Screening: NOW DUE  -cologuard and colonoscopy ordered; patient advised to complete only one of these colon cancer screenings and not both     Prostate Cancer Screening:  -PSA ordered     Heart Disease Screening:  -CT Heart Ca ordered     Immunizations Counseling  -Prevnar 20 and shingles now due ==>   -recommend updated COVID spike vaccine that can be obtained at local pharmacy      FOLLOW-UP: 4 weeks to discuss and review test results and 8 weeks for PHYSICAL          PMHx:  HTN  Atrial fibrillation (Multi); on xarelto  Stage 3b chronic kidney disease (Multi)  Acute non-ST segment elevation myocardial infarction (Multi)  Acute on chronic diastolic heart failure (Multi)  Acute on chronic respiratory failure (Multi)  Acute renal failure syndrome (CMS-HCC)  Anasarca  Bacterial pneumonia  Hypoalbuminemia  Hypokalemia  Hyponatremia  Hypoxia  Idiopathic sleep related nonobstructive alveolar hypoventilation  Injury of head  Injury of upper extremity  Obstructive sleep apnea syndrome  Supplemental oxygen dependent  Swallowing pain  Venous stasis dermatitis  Accidental fall     Healthcare Providers:  Cardiology:  NONE  Nephrologist: Dr. Michaud; last seen 11/2023     Preventive Health Services:  -Last physical: ?  -last colonoscopy or cologuard: POSITIVE COLOGUARD 6/21/24==> DIAGNOSTIC COLONOSCOPY ORDERED  -last STI screening: ?  -Hep C screening: ?        Immunizations:   -Childhood vaccines: completed per patient    -updated COVID spike vaccine: NOW DUE  -shingles: NOW DUE  -prevnar 20:   NOW DUE     Immunization History   Administered Date(s) Administered    Flu vaccine, quadrivalent, recombinant, preservative free, adult (FLUBLOK) 10/22/2021, 10/17/2022    Moderna SARS-CoV-2 Vaccination 04/02/2021, 04/30/2021    Pneumococcal polysaccharide vaccine, 23-valent, age 2 years and older (PNEUMOVAX 23) 10/22/2021    Tdap vaccine, age 7 year and older (BOOSTRIX, ADACEL) 06/13/2022       INTERVAL HISTORY     -completed cologuard colon cancer screening 6/21/24 that was POSITIVE     -ABNORMAL POSITIVE COLOGUARD COLON CANCER SCREENING TEST that REQUIRES DIAGNOSTIC COLONOSCOPY TO RULE OUT COLON CANCER.     ==> patient advised I have ordered a diagnostic colonoscopy; please call to schedule since This is very important.     -labs ordered 6/10/24 NOT YET COMPLETED    -CT Heart Ca scoring ordered 6/10/24  NOT  YET COMPLETED        Today  Byron reports:     - doing and feeling well.      -taking all medications as prescribed with no reported adverse medication side effects    -has not yet scheduled cardiology, nephrology, or pulmonology appts with referrals ordered at visit 6/10/24    -has not yet scheduled diagnostic colonoscopy for positive cologuard test; he states that he will call to schedule and that he understands that it is important to complete to rule out colon cancer          Today he has no other reported complaints, issues, or problems.    ROS is NEG for HEADACHE, NAUSEA, VOMITING, DIARRHEA, CHEST PAIN, SOB, and BLEEDING.  Review of systems (12) is negative for all systems except for any identified issues in HPI above.           Objective     There were no vitals taken for this visit.     Physical Examination:       GENERAL           General Appearance: well-appearing, well-developed, well-hydrated, well-nourished, no acute distress.        HEENT           NECK supple, no masses or thyromegaly, no carotid bruit.        EYES           Extraocular Movements: normal, bilateral eyes ROSETTE, no conjunctival injection.        HEART           Rate and Rhythm regular rate and rhythm. Heart sounds: normal S1S2, no S3 or S4. Murmurs: none.        CHEST           Breath sounds: Clear to IPPA, RR<16 no use of accessory muscles.        ABDOMEN           General: Neg for LKKS or masses, no scleral icterus or jaundice.        MUSCULOSKELETAL           Joints Demonstration: Neg for erythema, swelling or joint deformities. gross abnormalities no gross abnormalities.        EXTREMITIES           Lower Extremities: Neg for cyanosis, clubbing or edema.       Assessment/Plan   Problem List Items Addressed This Visit    None    ABNORMAL POSITIVE COLOGUARD COLON CANCER SCREENING TEST (6/21/24) that REQUIRES DIAGNOSTIC COLONOSCOPY TO RULE OUT COLON CANCER.   -diagnostic colonoscopy previously ordered. Patient advised this is very  important to complete to rule out colon cancer. He states that he will call to schedule     HTN: well controlled. Followed by cardiology Dr. Ruiz  -CMP, UA ordered 6/10/24==> NOT YET COMPLETED==> patient advised to go to [facility] lab to complete  -cont BP medications  -low salt, low cholesterol, low fat diet     Afib on xaerlto and CHF: rate controlled, no signs of ADHF.   -cont medications  -referral to cardiology Dr. Vallabehini ordered 6/10/24  -ordered general cardiology referral as well (to be seen by first available provider) 6/10/24  -Emergency Room precautions discussed and reviewed with patient     CKD stage 3B:  -CMP ordered 6/10/24==> NOT YET COMPLETED==> patient advised to go to [facility] lab to complete  -cont management per nephrology  -BP control to preserve renal function  -patient counseled to AVOID NSAIDs that can worsen renal function     Morbid obesity:  -lipid panel and HgBA1c ordered 6/10/24==> NOT YET COMPLETED==> patient advised to go to [facility] lab to complete  -low salt, low cholesterol, low fat diet, regularly exercise  -encouraged weight loss  -will cont to monitor        Supplemental O2 dependence: does not use supplemental O2 as previously advised  -referral to pulmonology ordered 6/10/24  -Emergency Room precautions discussed and reviewed with patient     CAMILLE: not using CPAP  -referral to sleep medicine ordered 6/10/24     Lipid Disorder screening  -lipid panel ordered 6/10/24==> NOT YET COMPLETED==> patient advised to go to [facility] lab to complete     Diabetes Screening  -HgBA1c ordered 6/10/24==> NOT YET COMPLETED==> patient advised to go to [facility] lab to complete     Vitamin D deficiency  -Vit D levels ordered 6/10/24==> NOT YET COMPLETED==> patient advised to go to [facility] lab to complete     Hep C screening  -Hep C antibody ordered 6/10/24==> NOT YET COMPLETED==> patient advised to go to [facility] lab to complete     STI Screening:  -HIV, syphilis, GC/CT, trich ordered 6/10/24==> NOT YET COMPLETED==> patient advised  to go to  lab to complete       Prostate Cancer Screening:  -PSA ordered  6/10/24==> NOT YET COMPLETED==> patient advised to go to  lab to complete     Heart Disease Screening:  -CT Heart Ca ordered 6/10/24     Counseling:       Medication education:         Education:  The patient is counseled regarding potential side-effects of all new medications        Understanding:  Patient expressed understanding        Adherence:  No barriers to adherence identified        Immunizations Counseling  -Prevnar 20 and shingles now due ==> declined today  -recommend updated COVID spike vaccine that can be obtained at local pharmacy     FOLLOW-UP: 4 weeks to discuss and review test results and 8 weeks for PHYSICAL     Discussed recommended plan of care with patient. Patient expressed understanding and agreement with plan of care. All of patient's questions were answered at the time. Patient had no additional questions at the time.         Ying Nick MD, PhD

## 2024-08-13 ENCOUNTER — APPOINTMENT (OUTPATIENT)
Dept: PRIMARY CARE | Facility: CLINIC | Age: 59
End: 2024-08-13
Payer: COMMERCIAL

## 2024-08-28 NOTE — PROGRESS NOTES
Subjective   Byron Sherman is a 59 y.o. male who presents for FOLLOW UP VISIT TO DISCUSS and REVIEW TEST RESULTS, INCLUDING POSITIVE COLOGUARD.    HPI:    58 y.o. male who presents for FOLLOW UP VISIT TO DISCUSS and REVIEW TEST RESULTS, INCLUDING POSITIVE COLOGUARD..     [facility] records reviewed.    Last seen by me on 6/10/24 for NPV TO ESTABLISH PCP CARE and FOR CARE GAP REVIEW. At visit:    Establish PCP care  -labs ordered (see A/P above for details)     HTN: 136/76  -CMP, UA ordered  -cont BP medications  -low salt, low cholesterol, low fat diet     Afib on xaerlto and CHF: rate controlled, no signs of ADHF.   -cont medications  -referral to cardiology Dr. Vallabehini ordered  -ordered general cardiology referral as well (to be seen by first available provider)  -Emergency Room precautions discussed and reviewed with patient     CKD stage 3B:  -CMP ordered  -cont management per nephrology  -BP control to preserve renal function     Morbid obesity:  -lipid panel and HgBA1c ordered  -low salt, low cholesterol, low fat diet, regularly exercise  -encouraged weight loss        Supplemental O2 dependence: does not use supplemental O2  -referral to pulmonology ordered  -Emergency Room precautions discussed and reviewed with patient     CAMILLE: not using CPAP  -referral to sleep medicine     Lipid Disorder screening  -lipid panel ordered     Diabetes Screening  -HgBA1c ordered     Vitamin D deficiency  -Vit D levels ordered     Hep C screening  -Hep C antibody ordered     STI Screening:  -HIV, syphilis, GC/CT, trich ordered     Colon Cancer Screening: NOW DUE  -cologuard and colonoscopy ordered; patient advised to complete only one of these colon cancer screenings and not both     Prostate Cancer Screening:  -PSA ordered     Heart Disease Screening:  -CT Heart Ca ordered     Immunizations Counseling  -Prevnar 20 and shingles now due ==>   -recommend updated COVID spike vaccine that can be obtained at local pharmacy      FOLLOW-UP: 4 weeks to discuss and review test results and 8 weeks for PHYSICAL          PMHx:  HTN  Atrial fibrillation (Multi); on xarelto  Stage 3b chronic kidney disease (Multi)  Acute non-ST segment elevation myocardial infarction (Multi)  Acute on chronic diastolic heart failure (Multi)  Acute on chronic respiratory failure (Multi)  Acute renal failure syndrome (CMS-HCC)  Anasarca  Bacterial pneumonia  Hypoalbuminemia  Hypokalemia  Hyponatremia  Hypoxia  Idiopathic sleep related nonobstructive alveolar hypoventilation  Injury of head  Injury of upper extremity  Obstructive sleep apnea syndrome  Supplemental oxygen dependent  Swallowing pain  Venous stasis dermatitis  Accidental fall     Healthcare Providers:  Cardiology:  NONE  Nephrologist: Dr. Michaud; last seen 11/2023     Preventive Health Services:  -Last physical: ?  -last colonoscopy or cologuard: POSITIVE COLOGUARD 6/21/24==> DIAGNOSTIC COLONOSCOPY ORDERED  -last STI screening: ?  -Hep C screening: ?        Immunizations:   -Childhood vaccines: completed per patient    -updated COVID spike vaccine: NOW DUE  -shingles: NOW DUE  -prevnar 20:   NOW DUE     Immunization History   Administered Date(s) Administered    Flu vaccine, quadrivalent, recombinant, preservative free, adult (FLUBLOK) 10/22/2021, 10/17/2022    Moderna SARS-CoV-2 Vaccination 04/02/2021, 04/30/2021    Pneumococcal polysaccharide vaccine, 23-valent, age 2 years and older (PNEUMOVAX 23) 10/22/2021    Tdap vaccine, age 7 year and older (BOOSTRIX, ADACEL) 06/13/2022       INTERVAL HISTORY     -completed cologuard colon cancer screening 6/21/24 that was POSITIVE     -ABNORMAL POSITIVE COLOGUARD COLON CANCER SCREENING TEST that REQUIRES DIAGNOSTIC COLONOSCOPY TO RULE OUT COLON CANCER.     ==> patient advised I have ordered a diagnostic colonoscopy; please call to schedule since This is very important.       -labs ordered 6/10/24 NOT YET COMPLETED    -CT Heart Ca scoring ordered 6/10/24  NOT  YET COMPLETED        Today  Byron reports:     - doing and feeling well.      -taking all medications as prescribed with no reported adverse medication side effects    -has not yet scheduled cardiology, nephrology, or pulmonology appts with referrals ordered at visit 6/10/24    -has not yet scheduled diagnostic colonoscopy for positive cologuard test; he states that he will call to schedule and that he understands that it is important to complete to rule out colon cancer          Today he has no other reported complaints, issues, or problems.    ROS is NEG for HEADACHE, NAUSEA, VOMITING, DIARRHEA, CHEST PAIN, SOB, and BLEEDING.  Review of systems (12) is negative for all systems except for any identified issues in HPI above.           Objective     There were no vitals taken for this visit.     Physical Examination:       GENERAL           General Appearance: well-appearing, well-developed, well-hydrated, well-nourished, no acute distress.        HEENT           NECK supple, no masses or thyromegaly, no carotid bruit.        EYES           Extraocular Movements: normal, bilateral eyes ROSETTE, no conjunctival injection.        HEART           Rate and Rhythm regular rate and rhythm. Heart sounds: normal S1S2, no S3 or S4. Murmurs: none.        CHEST           Breath sounds: Clear to IPPA, RR<16 no use of accessory muscles.        ABDOMEN           General: Neg for LKKS or masses, no scleral icterus or jaundice.        MUSCULOSKELETAL           Joints Demonstration: Neg for erythema, swelling or joint deformities. gross abnormalities no gross abnormalities.        EXTREMITIES           Lower Extremities: Neg for cyanosis, clubbing or edema.       Assessment/Plan   Problem List Items Addressed This Visit    None    ABNORMAL POSITIVE COLOGUARD COLON CANCER SCREENING TEST (6/21/24) that REQUIRES DIAGNOSTIC COLONOSCOPY TO RULE OUT COLON CANCER.   -diagnostic colonoscopy previously ordered==> re-ORDERED TODAY. Patient  advised this is very important to complete to rule out colon cancer. He states that he will call to schedule     HTN: well controlled. Followed by cardiology Dr. Ruiz  -CMP, UA ordered 6/10/24==> NOT YET COMPLETED==> patient advised to go to  lab to complete  -cont BP medications  -low salt, low cholesterol, low fat diet     Afib on xaerlto and CHF: rate controlled, no signs of ADHF.   -cont medications  -referral to cardiology Dr. Vallabehini ordered 6/10/24  -ordered general cardiology referral as well (to be seen by first available provider) 6/10/24  -Emergency Room precautions discussed and reviewed with patient     CKD stage 3B:  -CMP ordered 6/10/24==> NOT YET COMPLETED==> patient advised to go to [facility] lab to complete  -cont management per nephrology  -BP control to preserve renal function  -patient counseled to AVOID NSAIDs that can worsen renal function     Morbid obesity:  -lipid panel and HgBA1c ordered 6/10/24==> NOT YET COMPLETED==> patient advised to go to [facility] lab to complete  -low salt, low cholesterol, low fat diet, regularly exercise  -encouraged weight loss  -will cont to monitor        Supplemental O2 dependence: does not use supplemental O2 as previously advised  -referral to pulmonology ordered 6/10/24  -Emergency Room precautions discussed and reviewed with patient     CAMILLE: not using CPAP  -referral to sleep medicine ordered 6/10/24     Lipid Disorder screening  -lipid panel ordered 6/10/24==> NOT YET COMPLETED==> patient advised to go to [facility] lab to complete     Diabetes Screening  -HgBA1c ordered 6/10/24==> NOT YET COMPLETED==> patient advised to go to [facility] lab to complete     Vitamin D deficiency  -Vit D levels ordered 6/10/24==> NOT YET COMPLETED==> patient advised to go to [facility] lab to complete     Hep C screening  -Hep C antibody ordered 6/10/24==> NOT YET COMPLETED==> patient advised to go to [facility] lab to complete     STI Screening:  -HIV, syphilis, GC/CT, trich ordered 6/10/24==> NOT YET  COMPLETED==> patient advised to go to  lab to complete       Prostate Cancer Screening:  -PSA ordered  6/10/24==> NOT YET COMPLETED==> patient advised to go to  lab to complete     Heart Disease Screening:  -CT Heart Ca ordered 6/10/24     Counseling:       Medication education:         Education:  The patient is counseled regarding potential side-effects of all new medications        Understanding:  Patient expressed understanding        Adherence:  No barriers to adherence identified        Immunizations Counseling  -Prevnar 20 and shingles now due ==> declined today  -Flu vaccine due fall 2024  -recommend updated COVID spike vaccine that can be obtained at local pharmacy     FOLLOW-UP: 4 weeks to discuss and review test results and 8 weeks for PHYSICAL     Discussed recommended plan of care with patient. Patient expressed understanding and agreement with plan of care. All of patient's questions were answered at the time. Patient had no additional questions at the time.         Ying Nick MD, PhD

## 2024-08-30 ENCOUNTER — APPOINTMENT (OUTPATIENT)
Dept: PRIMARY CARE | Facility: CLINIC | Age: 59
End: 2024-08-30
Payer: COMMERCIAL

## 2024-08-30 DIAGNOSIS — I48.91 ATRIAL FIBRILLATION, UNSPECIFIED TYPE (MULTI): ICD-10-CM

## 2024-08-30 DIAGNOSIS — Z12.5 PROSTATE CANCER SCREENING: ICD-10-CM

## 2024-08-30 DIAGNOSIS — J96.21 ACUTE ON CHRONIC RESPIRATORY FAILURE WITH HYPOXIA (MULTI): ICD-10-CM

## 2024-08-30 DIAGNOSIS — N18.32 STAGE 3B CHRONIC KIDNEY DISEASE (MULTI): ICD-10-CM

## 2024-08-30 DIAGNOSIS — R19.5 POSITIVE COLORECTAL CANCER SCREENING USING COLOGUARD TEST: Primary | ICD-10-CM

## 2024-08-30 DIAGNOSIS — E66.01 MORBID OBESITY (MULTI): ICD-10-CM

## 2024-08-30 DIAGNOSIS — Z91.199 NONCOMPLIANCE WITH CPAP TREATMENT: ICD-10-CM

## 2024-08-30 DIAGNOSIS — Z13.220 LIPID SCREENING: ICD-10-CM

## 2024-08-30 DIAGNOSIS — G47.33 OBSTRUCTIVE SLEEP APNEA SYNDROME: ICD-10-CM

## 2024-08-30 DIAGNOSIS — I10 PRIMARY HYPERTENSION: ICD-10-CM

## 2024-08-30 DIAGNOSIS — Z71.85 IMMUNIZATION COUNSELING: ICD-10-CM

## 2024-09-09 ENCOUNTER — APPOINTMENT (OUTPATIENT)
Dept: PRIMARY CARE | Facility: CLINIC | Age: 59
End: 2024-09-09
Payer: COMMERCIAL

## 2024-11-22 DIAGNOSIS — N18.32 CHRONIC KIDNEY DISEASE, STAGE 3B (MULTI): Primary | ICD-10-CM

## 2024-11-25 ENCOUNTER — LAB (OUTPATIENT)
Dept: LAB | Facility: LAB | Age: 59
End: 2024-11-25
Payer: COMMERCIAL

## 2024-11-25 ENCOUNTER — APPOINTMENT (OUTPATIENT)
Dept: LAB | Facility: LAB | Age: 59
End: 2024-11-25
Payer: COMMERCIAL

## 2024-11-25 DIAGNOSIS — N18.32 CHRONIC KIDNEY DISEASE, STAGE 3B (MULTI): ICD-10-CM

## 2024-11-25 LAB
ALBUMIN SERPL BCP-MCNC: 3.5 G/DL (ref 3.4–5)
ANION GAP SERPL CALCULATED.3IONS-SCNC: 15 MMOL/L (ref 10–20)
APPEARANCE UR: CLEAR
BILIRUB UR STRIP.AUTO-MCNC: NEGATIVE MG/DL
BUN SERPL-MCNC: 27 MG/DL (ref 6–23)
CALCIUM SERPL-MCNC: 8.6 MG/DL (ref 8.6–10.3)
CHLORIDE SERPL-SCNC: 93 MMOL/L (ref 98–107)
CO2 SERPL-SCNC: 34 MMOL/L (ref 21–32)
COLOR UR: YELLOW
CREAT SERPL-MCNC: 1.52 MG/DL (ref 0.5–1.3)
CREAT UR-MCNC: 140.5 MG/DL (ref 20–370)
EGFRCR SERPLBLD CKD-EPI 2021: 53 ML/MIN/1.73M*2
ERYTHROCYTE [DISTWIDTH] IN BLOOD BY AUTOMATED COUNT: 15.9 % (ref 11.5–14.5)
GLUCOSE SERPL-MCNC: 115 MG/DL (ref 74–99)
GLUCOSE UR STRIP.AUTO-MCNC: NORMAL MG/DL
HCT VFR BLD AUTO: 45.3 % (ref 41–52)
HGB BLD-MCNC: 13.2 G/DL (ref 13.5–17.5)
HYALINE CASTS #/AREA URNS AUTO: ABNORMAL /LPF
KETONES UR STRIP.AUTO-MCNC: NEGATIVE MG/DL
LEUKOCYTE ESTERASE UR QL STRIP.AUTO: NEGATIVE
MCH RBC QN AUTO: 26.2 PG (ref 26–34)
MCHC RBC AUTO-ENTMCNC: 29.1 G/DL (ref 32–36)
MCV RBC AUTO: 90 FL (ref 80–100)
MICROALBUMIN UR-MCNC: 184.9 MG/L
MICROALBUMIN/CREAT UR: 131.6 UG/MG CREAT
MUCOUS THREADS #/AREA URNS AUTO: ABNORMAL /LPF
NITRITE UR QL STRIP.AUTO: NEGATIVE
NRBC BLD-RTO: 0 /100 WBCS (ref 0–0)
PH UR STRIP.AUTO: 6 [PH]
PHOSPHATE SERPL-MCNC: 3.6 MG/DL (ref 2.5–4.9)
PLATELET # BLD AUTO: 278 X10*3/UL (ref 150–450)
POTASSIUM SERPL-SCNC: 3.9 MMOL/L (ref 3.5–5.3)
PROT UR STRIP.AUTO-MCNC: ABNORMAL MG/DL
RBC # BLD AUTO: 5.03 X10*6/UL (ref 4.5–5.9)
RBC # UR STRIP.AUTO: NEGATIVE /UL
RBC #/AREA URNS AUTO: ABNORMAL /HPF
SODIUM SERPL-SCNC: 138 MMOL/L (ref 136–145)
SP GR UR STRIP.AUTO: 1.02
UROBILINOGEN UR STRIP.AUTO-MCNC: NORMAL MG/DL
WBC # BLD AUTO: 9.9 X10*3/UL (ref 4.4–11.3)
WBC #/AREA URNS AUTO: ABNORMAL /HPF

## 2024-11-25 PROCEDURE — 82043 UR ALBUMIN QUANTITATIVE: CPT

## 2024-11-25 PROCEDURE — 36415 COLL VENOUS BLD VENIPUNCTURE: CPT

## 2024-11-25 PROCEDURE — 82570 ASSAY OF URINE CREATININE: CPT

## 2024-11-25 PROCEDURE — 80069 RENAL FUNCTION PANEL: CPT

## 2024-11-25 PROCEDURE — 81001 URINALYSIS AUTO W/SCOPE: CPT

## 2024-11-25 PROCEDURE — 85027 COMPLETE CBC AUTOMATED: CPT

## 2025-04-24 ENCOUNTER — APPOINTMENT (OUTPATIENT)
Dept: RADIOLOGY | Facility: HOSPITAL | Age: 60
End: 2025-04-24
Payer: COMMERCIAL

## 2025-04-24 ENCOUNTER — HOSPITAL ENCOUNTER (EMERGENCY)
Facility: HOSPITAL | Age: 60
Discharge: HOME | End: 2025-04-24
Payer: COMMERCIAL

## 2025-04-24 ENCOUNTER — APPOINTMENT (OUTPATIENT)
Dept: CARDIOLOGY | Facility: HOSPITAL | Age: 60
End: 2025-04-24
Payer: COMMERCIAL

## 2025-04-24 VITALS
HEART RATE: 87 BPM | BODY MASS INDEX: 44.1 KG/M2 | SYSTOLIC BLOOD PRESSURE: 107 MMHG | OXYGEN SATURATION: 94 % | RESPIRATION RATE: 16 BRPM | TEMPERATURE: 98.1 F | WEIGHT: 315 LBS | HEIGHT: 71 IN | DIASTOLIC BLOOD PRESSURE: 75 MMHG

## 2025-04-24 DIAGNOSIS — R06.00 DYSPNEA, UNSPECIFIED TYPE: ICD-10-CM

## 2025-04-24 DIAGNOSIS — M25.579 ACUTE ANKLE PAIN, UNSPECIFIED LATERALITY: Primary | ICD-10-CM

## 2025-04-24 DIAGNOSIS — M10.9 ACUTE GOUT OF ANKLE, UNSPECIFIED CAUSE, UNSPECIFIED LATERALITY: ICD-10-CM

## 2025-04-24 LAB
ALBUMIN SERPL BCP-MCNC: 3.7 G/DL (ref 3.4–5)
ALP SERPL-CCNC: 66 U/L (ref 33–120)
ALT SERPL W P-5'-P-CCNC: 7 U/L (ref 10–52)
ANION GAP SERPL CALCULATED.3IONS-SCNC: 12 MMOL/L (ref 10–20)
APPEARANCE UR: CLEAR
AST SERPL W P-5'-P-CCNC: 16 U/L (ref 9–39)
BASOPHILS # BLD AUTO: 0.06 X10*3/UL (ref 0–0.1)
BASOPHILS NFR BLD AUTO: 0.5 %
BILIRUB SERPL-MCNC: 1.1 MG/DL (ref 0–1.2)
BILIRUB UR STRIP.AUTO-MCNC: NEGATIVE MG/DL
BNP SERPL-MCNC: 93 PG/ML (ref 0–99)
BUN SERPL-MCNC: 22 MG/DL (ref 6–23)
CALCIUM SERPL-MCNC: 9 MG/DL (ref 8.6–10.3)
CARDIAC TROPONIN I PNL SERPL HS: 7 NG/L (ref 0–20)
CARDIAC TROPONIN I PNL SERPL HS: 7 NG/L (ref 0–20)
CHLORIDE SERPL-SCNC: 91 MMOL/L (ref 98–107)
CO2 SERPL-SCNC: 37 MMOL/L (ref 21–32)
COLOR UR: COLORLESS
CREAT SERPL-MCNC: 1.34 MG/DL (ref 0.5–1.3)
EGFRCR SERPLBLD CKD-EPI 2021: 61 ML/MIN/1.73M*2
EOSINOPHIL # BLD AUTO: 0.41 X10*3/UL (ref 0–0.7)
EOSINOPHIL NFR BLD AUTO: 3.1 %
ERYTHROCYTE [DISTWIDTH] IN BLOOD BY AUTOMATED COUNT: 16 % (ref 11.5–14.5)
GLUCOSE SERPL-MCNC: 89 MG/DL (ref 74–99)
GLUCOSE UR STRIP.AUTO-MCNC: NORMAL MG/DL
HCT VFR BLD AUTO: 43.3 % (ref 41–52)
HGB BLD-MCNC: 13.1 G/DL (ref 13.5–17.5)
HOLD SPECIMEN: NORMAL
IMM GRANULOCYTES # BLD AUTO: 0.08 X10*3/UL (ref 0–0.7)
IMM GRANULOCYTES NFR BLD AUTO: 0.6 % (ref 0–0.9)
KETONES UR STRIP.AUTO-MCNC: NEGATIVE MG/DL
LEUKOCYTE ESTERASE UR QL STRIP.AUTO: NEGATIVE
LYMPHOCYTES # BLD AUTO: 2.2 X10*3/UL (ref 1.2–4.8)
LYMPHOCYTES NFR BLD AUTO: 16.7 %
MCH RBC QN AUTO: 26.1 PG (ref 26–34)
MCHC RBC AUTO-ENTMCNC: 30.3 G/DL (ref 32–36)
MCV RBC AUTO: 86 FL (ref 80–100)
MONOCYTES # BLD AUTO: 1.11 X10*3/UL (ref 0.1–1)
MONOCYTES NFR BLD AUTO: 8.4 %
NEUTROPHILS # BLD AUTO: 9.29 X10*3/UL (ref 1.2–7.7)
NEUTROPHILS NFR BLD AUTO: 70.7 %
NITRITE UR QL STRIP.AUTO: NEGATIVE
NRBC BLD-RTO: 0 /100 WBCS (ref 0–0)
PH UR STRIP.AUTO: 7 [PH]
PLATELET # BLD AUTO: 340 X10*3/UL (ref 150–450)
POTASSIUM SERPL-SCNC: 3.9 MMOL/L (ref 3.5–5.3)
PROT SERPL-MCNC: 8.3 G/DL (ref 6.4–8.2)
PROT UR STRIP.AUTO-MCNC: NEGATIVE MG/DL
RBC # BLD AUTO: 5.02 X10*6/UL (ref 4.5–5.9)
RBC # UR STRIP.AUTO: NEGATIVE MG/DL
SODIUM SERPL-SCNC: 136 MMOL/L (ref 136–145)
SP GR UR STRIP.AUTO: 1.01
URATE SERPL-MCNC: 10.6 MG/DL (ref 4–7.5)
UROBILINOGEN UR STRIP.AUTO-MCNC: NORMAL MG/DL
WBC # BLD AUTO: 13.2 X10*3/UL (ref 4.4–11.3)

## 2025-04-24 PROCEDURE — 85025 COMPLETE CBC W/AUTO DIFF WBC: CPT | Performed by: PHYSICIAN ASSISTANT

## 2025-04-24 PROCEDURE — 36415 COLL VENOUS BLD VENIPUNCTURE: CPT | Performed by: PHYSICIAN ASSISTANT

## 2025-04-24 PROCEDURE — 84075 ASSAY ALKALINE PHOSPHATASE: CPT | Performed by: PHYSICIAN ASSISTANT

## 2025-04-24 PROCEDURE — 96374 THER/PROPH/DIAG INJ IV PUSH: CPT

## 2025-04-24 PROCEDURE — 83880 ASSAY OF NATRIURETIC PEPTIDE: CPT | Performed by: PHYSICIAN ASSISTANT

## 2025-04-24 PROCEDURE — 84550 ASSAY OF BLOOD/URIC ACID: CPT | Performed by: PHYSICIAN ASSISTANT

## 2025-04-24 PROCEDURE — 73610 X-RAY EXAM OF ANKLE: CPT | Mod: BILATERAL PROCEDURE | Performed by: RADIOLOGY

## 2025-04-24 PROCEDURE — 2500000004 HC RX 250 GENERAL PHARMACY W/ HCPCS (ALT 636 FOR OP/ED): Performed by: PHYSICIAN ASSISTANT

## 2025-04-24 PROCEDURE — 73610 X-RAY EXAM OF ANKLE: CPT | Mod: 50

## 2025-04-24 PROCEDURE — 73600 X-RAY EXAM OF ANKLE: CPT | Mod: 50

## 2025-04-24 PROCEDURE — 93005 ELECTROCARDIOGRAM TRACING: CPT

## 2025-04-24 PROCEDURE — 71045 X-RAY EXAM CHEST 1 VIEW: CPT | Performed by: RADIOLOGY

## 2025-04-24 PROCEDURE — 99285 EMERGENCY DEPT VISIT HI MDM: CPT | Mod: 25

## 2025-04-24 PROCEDURE — 84484 ASSAY OF TROPONIN QUANT: CPT | Performed by: PHYSICIAN ASSISTANT

## 2025-04-24 PROCEDURE — 81003 URINALYSIS AUTO W/O SCOPE: CPT | Performed by: PHYSICIAN ASSISTANT

## 2025-04-24 PROCEDURE — 71045 X-RAY EXAM CHEST 1 VIEW: CPT

## 2025-04-24 RX ORDER — KETOROLAC TROMETHAMINE 15 MG/ML
15 INJECTION, SOLUTION INTRAMUSCULAR; INTRAVENOUS ONCE
Status: COMPLETED | OUTPATIENT
Start: 2025-04-24 | End: 2025-04-24

## 2025-04-24 RX ORDER — METHYLPREDNISOLONE 4 MG/1
TABLET ORAL
Qty: 21 TABLET | Refills: 0 | Status: SHIPPED | OUTPATIENT
Start: 2025-04-24 | End: 2025-05-01

## 2025-04-24 RX ORDER — INDOMETHACIN 25 MG/1
25 CAPSULE ORAL
Qty: 20 CAPSULE | Refills: 0 | Status: SHIPPED | OUTPATIENT
Start: 2025-04-24 | End: 2025-05-04

## 2025-04-24 RX ORDER — PREDNISONE 20 MG/1
60 TABLET ORAL ONCE
Status: COMPLETED | OUTPATIENT
Start: 2025-04-24 | End: 2025-04-24

## 2025-04-24 RX ADMIN — PREDNISONE 60 MG: 20 TABLET ORAL at 14:36

## 2025-04-24 RX ADMIN — KETOROLAC TROMETHAMINE 15 MG: 15 INJECTION, SOLUTION INTRAMUSCULAR; INTRAVENOUS at 12:47

## 2025-04-24 ASSESSMENT — PAIN SCALES - GENERAL
PAINLEVEL_OUTOF10: 3
PAINLEVEL_OUTOF10: 10 - WORST POSSIBLE PAIN

## 2025-04-24 ASSESSMENT — PAIN - FUNCTIONAL ASSESSMENT
PAIN_FUNCTIONAL_ASSESSMENT: 0-10
PAIN_FUNCTIONAL_ASSESSMENT: 0-10

## 2025-04-24 ASSESSMENT — COLUMBIA-SUICIDE SEVERITY RATING SCALE - C-SSRS
6. HAVE YOU EVER DONE ANYTHING, STARTED TO DO ANYTHING, OR PREPARED TO DO ANYTHING TO END YOUR LIFE?: NO
2. HAVE YOU ACTUALLY HAD ANY THOUGHTS OF KILLING YOURSELF?: NO
1. IN THE PAST MONTH, HAVE YOU WISHED YOU WERE DEAD OR WISHED YOU COULD GO TO SLEEP AND NOT WAKE UP?: NO

## 2025-04-24 NOTE — ED PROVIDER NOTES
HPI   Chief Complaint   Patient presents with    Foot Pain       59-year-old male presented emergency department with a chief complaint of bilateral ankle pain worse in the left ankle he is concerned he may have gout.  Additionally he is concerned he may be in a congestive heart failure flareup he complains of some exertional dyspnea.  He is not having chest discomfort.  He does not have a primary doctor but does follow with nephrology and cardiology.  He denies lightheadedness dizziness numbness weakness.  States he is having pain with weightbearing.  Walks with a cane at baseline.  No other complaint.              Patient History   Medical History[1]  Surgical History[2]  Family History[3]  Social History[4]    Physical Exam   ED Triage Vitals [04/24/25 1136]   Temperature Heart Rate Respirations BP   36.7 °C (98.1 °F) 87 16 107/75      Pulse Ox Temp Source Heart Rate Source Patient Position   94 % Temporal -- --      BP Location FiO2 (%)     -- --       Physical Exam  Vitals and nursing note reviewed.   Constitutional:       Appearance: Normal appearance.   HENT:      Head: Normocephalic.      Nose: Nose normal.      Mouth/Throat:      Mouth: Mucous membranes are moist.   Cardiovascular:      Rate and Rhythm: Normal rate and regular rhythm.   Pulmonary:      Effort: Pulmonary effort is normal.      Breath sounds: Normal breath sounds.      Comments: Slightly diminished at bases  Abdominal:      General: Abdomen is flat.      Palpations: Abdomen is soft.   Musculoskeletal:         General: Normal range of motion.      Cervical back: Normal range of motion.      Comments: Mild swelling and tenderness to the left lateral ankle   Skin:     General: Skin is warm and dry.   Neurological:      General: No focal deficit present.      Mental Status: He is alert and oriented to person, place, and time.   Psychiatric:         Mood and Affect: Mood normal.           ED Course & MDM   ED Course as of 04/24/25 1434   Thu Apr  24, 2025   1230 EKG interpreted by myself independently, EKG shows a rate controlled A-fib, rate 93 bpm, , , QTc 487, patient has no ST elevation or depression, negative for acute MI. [CORY]      ED Course User Index  [CORY] Edwin Duckworth DO         Diagnoses as of 04/24/25 1434   Acute ankle pain, unspecified laterality   Acute gout of ankle, unspecified cause, unspecified laterality   Dyspnea, unspecified type                 No data recorded     Yovany Coma Scale Score: 15 (04/24/25 1133 : Moni Pina, TERESA)                           Medical Decision Making  I have seen and evaluated this patient.  Physician available for consultation.  Vital signs have been reviewed.  All laboratory and diagnostic imaging is reviewed by myself and interpreted by myself unless otherwise stated.  Additionally imaging is interpreted by radiologist.    Nonspecific 13,000 leukocytosis, no significant anemia metabolic panel without alejandro renal impairment electrolyte abnormality.  Glucose within normal range.  Uric acid elevated.  proBNP within normal limit.  Troponins are within normal limits.  Chest x-ray without acute cardiopulmonary process.  Most consistent with gout.  Patient with medication interaction secondary to antiarrhythmic.  Placed on a course of steroid with podiatry follow-up.  Released in stable condition from emergent standpoint.    Labs Reviewed  CBC WITH AUTO DIFFERENTIAL - Abnormal     WBC                           13.2 (*)               nRBC                          0.0                    RBC                           5.02                   Hemoglobin                    13.1 (*)               Hematocrit                    43.3                   MCV                           86                     MCH                           26.1                   MCHC                          30.3 (*)               RDW                           16.0 (*)               Platelets                     340                     Neutrophils %                 70.7                   Immature Granulocytes %, Automated   0.6                    Lymphocytes %                 16.7                   Monocytes %                   8.4                    Eosinophils %                 3.1                    Basophils %                   0.5                    Neutrophils Absolute          9.29 (*)               Immature Granulocytes Absolute, Au*   0.08                   Lymphocytes Absolute          2.20                   Monocytes Absolute            1.11 (*)               Eosinophils Absolute          0.41                   Basophils Absolute            0.06                COMPREHENSIVE METABOLIC PANEL - Abnormal     Glucose                       89                     Sodium                        136                    Potassium                     3.9                    Chloride                      91 (*)                 Bicarbonate                   37 (*)                 Anion Gap                     12                     Urea Nitrogen                 22                     Creatinine                    1.34 (*)               eGFR                          61                     Calcium                       9.0                    Albumin                       3.7                    Alkaline Phosphatase          66                     Total Protein                 8.3 (*)                AST                           16                     Bilirubin, Total              1.1                    ALT                           7 (*)               URIC ACID - Abnormal     Uric Acid                     10.6 (*)            URINALYSIS WITH REFLEX CULTURE AND MICROSCOPIC - Abnormal     Color, Urine                  Colorless (*)               Appearance, Urine             Clear                  Specific Gravity, Urine       1.007                  pH, Urine                     7.0                    Protein, Urine                NEGATIVE                 Glucose, Urine                Normal                 Blood, Urine                  NEGATIVE                Ketones, Urine                NEGATIVE                Bilirubin, Urine              NEGATIVE                Urobilinogen, Urine           Normal                 Nitrite, Urine                NEGATIVE                Leukocyte Esterase, Urine     NEGATIVE             B-TYPE NATRIURETIC PEPTIDE - Normal     BNP                           93                       Narrative:    <100 pg/mL - Heart failure unlikely                100-299 pg/mL - Intermediate probability of acute heart                                failure exacerbation. Correlate with clinical                                context and patient history.                  >=300 pg/mL - Heart Failure likely. Correlate with clinical                                context and patient history.                                BNP testing is performed using different testing methodology at [facility] than at other [facility] hospitals. Direct result comparisons should only be made within the same method.                   SERIAL TROPONIN-INITIAL - Normal     Troponin I, High Sensitivity   7                        Narrative: Less than 99th percentile of normal range cutoff-                Female and children under 18 years old <14 ng/L; Male <21 ng/L: Negative                Repeat testing should be performed if clinically indicated.                                 Female and children under 18 years old 14-50 ng/L; Male 21-50 ng/L:                Consistent with possible cardiac damage and possible increased clinical                 risk. Serial measurements may help to assess extent of myocardial damage.                                 >50 ng/L: Consistent with cardiac damage, increased clinical risk and                myocardial infarction. Serial measurements may help assess extent of                 myocardial damage.                                   NOTE: Children less than 1 year old may have higher baseline troponin                 levels and results should be interpreted in conjunction with the overall                 clinical context.                                 NOTE: Troponin I testing is performed using a different                 testing methodology at [facility] than at other                 [facility]. Direct result comparisons should only                 be made within the same method.  SERIAL TROPONIN, 1 HOUR - Normal     Troponin I, High Sensitivity   7                        Narrative: Less than 99th percentile of normal range cutoff-                Female and children under 18 years old <14 ng/L; Male <21 ng/L: Negative                Repeat testing should be performed if clinically indicated.                                 Female and children under 18 years old 14-50 ng/L; Male 21-50 ng/L:                Consistent with possible cardiac damage and possible increased clinical                 risk. Serial measurements may help to assess extent of myocardial damage.                                 >50 ng/L: Consistent with cardiac damage, increased clinical risk and                myocardial infarction. Serial measurements may help assess extent of                 myocardial damage.                                  NOTE: Children less than 1 year old may have higher baseline troponin                 levels and results should be interpreted in conjunction with the overall                 clinical context.                                 NOTE: Troponin I testing is performed using a different                 testing methodology at [facility] than at other                 [facility]. Direct result comparisons should only                 be made within the same method.  TROPONIN SERIES- (INITIAL, 1 HR)       Narrative: The following orders were created for panel order Troponin Series, (0, 1 HR).                 Procedure                               Abnormality         Status                                   ---------                               -----------         ------                                   Troponin I, High Sensiti...[[id number]]  Normal              Final result                             Troponin, High Sensitivi...[[id number]]  Normal              Final result                                             Please view results for these tests on the individual orders.  URINALYSIS WITH REFLEX CULTURE AND MICROSCOPIC       Narrative: The following orders were created for panel order Urinalysis with Reflex Culture and Microscopic.                Procedure                               Abnormality         Status                                   ---------                               -----------         ------                                   Urinalysis with Reflex C...[[id number]]  Abnormal            Final result                             Extra Urine Gray Tube[[id number]]                            In process                                               Please view results for these tests on the individual orders.  EXTRA URINE GRAY TUBE  XR ankle bilateral 2 views   Final Result          Bilateral lower extremity soft tissue swelling. Mild degenerative    changes of the bilateral hindfoot and midfoot without acute osseous    abnormality.          Signed by: Chinedu Myers 4/24/2025 12:24 PM    Dictation workstation:   [id number]     XR chest 1 view   Final Result          Cardiomegaly with some mild prominence of the basilar interstitium    suggesting mild edema.          No consolidation or large effusion.          Signed by: Chinedu Myers 4/24/2025 12:24 PM    Dictation workstation:   [id number]     Medications  predniSONE (Deltasone) tablet 60 mg (has no administration in time range)  ketorolac (Toradol) injection 15 mg (15 mg intravenous Given 4/24/25 1247)  New Prescriptions  INDOMETHACIN (INDOCIN)  25 MG CAPSULE     Take 1 capsule (25 mg) by mouth 2 times daily (morning and late afternoon) for 10 days.  METHYLPREDNISOLONE (MEDROL DOSPAK) 4 MG TABLETS     Follow schedule on package instructions            Procedure  Procedures       [1]   Past Medical History:  Diagnosis Date    Acute non-ST segment elevation myocardial infarction (Multi) 12/05/2023    Acute on chronic diastolic heart failure (Multi) 12/05/2023    Atrial fibrillation (Multi) 12/05/2023    Dyspnea 12/05/2023    Hypertension 12/05/2023    Non-STEMI (non-ST elevated myocardial infarction) (Multi) 12/05/2023   [2] No past surgical history on file.  [3] No family history on file.  [4]   Social History  Tobacco Use    Smoking status: Never    Smokeless tobacco: Never   Substance Use Topics    Alcohol use: Not Currently    Drug use: Never        Alessandro Montiel PA-C  04/24/25 2592

## 2025-04-24 NOTE — ED TRIAGE NOTES
Pt arrives to ED with c/o b/l foot pain that has been ongoing for the past 2 months, L > R, states he is unable to put pressure on L foot now. States he thinks it is gout, has been trying tylenol for pain. Does not have established PCP so has not been seen for this pain.    Statement Selected

## 2025-08-11 ENCOUNTER — PATIENT OUTREACH (OUTPATIENT)
Dept: PRIMARY CARE | Facility: CLINIC | Age: 60
End: 2025-08-11
Payer: COMMERCIAL

## 2025-08-21 ENCOUNTER — PATIENT OUTREACH (OUTPATIENT)
Dept: PRIMARY CARE | Facility: CLINIC | Age: 60
End: 2025-08-21
Payer: COMMERCIAL